# Patient Record
Sex: MALE | Race: WHITE | NOT HISPANIC OR LATINO | URBAN - METROPOLITAN AREA
[De-identification: names, ages, dates, MRNs, and addresses within clinical notes are randomized per-mention and may not be internally consistent; named-entity substitution may affect disease eponyms.]

---

## 2023-11-27 ENCOUNTER — NEW PATIENT COMPREHENSIVE (OUTPATIENT)
Dept: URBAN - METROPOLITAN AREA CLINIC 45 | Facility: CLINIC | Age: 58
End: 2023-11-27

## 2023-11-27 DIAGNOSIS — E11.9: ICD-10-CM

## 2023-11-27 DIAGNOSIS — Z98.890: ICD-10-CM

## 2023-11-27 DIAGNOSIS — H52.223: ICD-10-CM

## 2023-11-27 DIAGNOSIS — H52.03: ICD-10-CM

## 2023-11-27 DIAGNOSIS — H52.4: ICD-10-CM

## 2023-11-27 PROCEDURE — 92004 COMPRE OPH EXAM NEW PT 1/>: CPT

## 2023-11-27 PROCEDURE — 92015 DETERMINE REFRACTIVE STATE: CPT

## 2023-11-27 ASSESSMENT — VISUAL ACUITY
OD_CC: 20/20-2
OD_SC: 20/40
OS_CC: 20/20
OS_SC: 20/20-2

## 2023-11-27 ASSESSMENT — KERATOMETRY
OS_AXISANGLE_DEGREES: 24
OS_AXISANGLE2_DEGREES: 114
OS_K1POWER_DIOPTERS: 41.5
OS_K2POWER_DIOPTERS: 42
OD_K2POWER_DIOPTERS: 42
OD_AXISANGLE_DEGREES: 165
OD_K1POWER_DIOPTERS: 41
OD_AXISANGLE2_DEGREES: 75

## 2023-12-06 ENCOUNTER — RX CHECK (OUTPATIENT)
Dept: URBAN - METROPOLITAN AREA CLINIC 45 | Facility: CLINIC | Age: 58
End: 2023-12-06

## 2023-12-06 DIAGNOSIS — H52.4: ICD-10-CM

## 2023-12-06 PROCEDURE — 92015 DETERMINE REFRACTIVE STATE: CPT | Mod: NC

## 2023-12-06 ASSESSMENT — VISUAL ACUITY
OD_CC: 20/20
OS_CC: 20/20

## 2024-02-07 ENCOUNTER — APPOINTMENT (RX ONLY)
Dept: URBAN - METROPOLITAN AREA CLINIC 151 | Facility: CLINIC | Age: 59
Setting detail: DERMATOLOGY
End: 2024-02-07

## 2024-02-07 DIAGNOSIS — L91.8 OTHER HYPERTROPHIC DISORDERS OF THE SKIN: ICD-10-CM

## 2024-02-07 DIAGNOSIS — L57.8 OTHER SKIN CHANGES DUE TO CHRONIC EXPOSURE TO NONIONIZING RADIATION: ICD-10-CM

## 2024-02-07 PROBLEM — D23.72 OTHER BENIGN NEOPLASM OF SKIN OF LEFT LOWER LIMB, INCLUDING HIP: Status: ACTIVE | Noted: 2024-02-07

## 2024-02-07 PROBLEM — D23.9 OTHER BENIGN NEOPLASM OF SKIN, UNSPECIFIED: Status: ACTIVE | Noted: 2024-02-07

## 2024-02-07 PROCEDURE — ? REFERRAL CORRESPONDENCE

## 2024-02-07 PROCEDURE — ? SUNSCREEN RECOMMENDATIONS

## 2024-02-07 PROCEDURE — 99203 OFFICE O/P NEW LOW 30 MIN: CPT

## 2024-02-07 PROCEDURE — ? DIAGNOSIS COMMENT

## 2024-02-07 PROCEDURE — ? COUNSELING

## 2024-02-07 ASSESSMENT — LOCATION ZONE DERM: LOCATION ZONE: LEG

## 2024-02-07 ASSESSMENT — LOCATION DETAILED DESCRIPTION DERM: LOCATION DETAILED: RIGHT ANTERIOR PROXIMAL THIGH

## 2024-02-07 ASSESSMENT — LOCATION SIMPLE DESCRIPTION DERM: LOCATION SIMPLE: RIGHT THIGH

## 2024-02-07 NOTE — PROCEDURE: DIAGNOSIS COMMENT
Detail Level: Zone
Render Risk Assessment In Note?: yes
Comment: Reassured benign
Comment: Deferred removal today. Reassured benign.

## 2024-02-07 NOTE — HPI: OTHER
Condition:: Spot check
Please Describe Your Condition:: Patient’s PCP referred patient for spot on lower L leg and a skin tag around the groin.

## 2025-04-14 ENCOUNTER — APPOINTMENT (OUTPATIENT)
Dept: URBAN - METROPOLITAN AREA CLINIC 151 | Facility: CLINIC | Age: 60
Setting detail: DERMATOLOGY
End: 2025-04-14

## 2025-04-14 PROBLEM — D23.72 OTHER BENIGN NEOPLASM OF SKIN OF LEFT LOWER LIMB, INCLUDING HIP: Status: ACTIVE | Noted: 2025-04-14

## 2025-04-14 PROBLEM — D23.9 OTHER BENIGN NEOPLASM OF SKIN, UNSPECIFIED: Status: ACTIVE | Noted: 2025-04-14

## 2025-04-14 PROCEDURE — ? COUNSELING

## 2025-04-14 PROCEDURE — ? DIAGNOSIS COMMENT

## 2025-04-14 PROCEDURE — 99213 OFFICE O/P EST LOW 20 MIN: CPT

## 2025-04-14 NOTE — HPI: OTHER
Condition:: FBSE
Please Describe Your Condition:: Curious about the dermatofibroma (8mm tan papule) on L proximal pretibial region, examined in Feb2024 visit, wants reassurance it has not changed.

## 2025-04-14 NOTE — PROCEDURE: DIAGNOSIS COMMENT
Detail Level: Zone
Render Risk Assessment In Note?: yes
Comment: Spot of concern per HPI, reassured benign.